# Patient Record
Sex: FEMALE
[De-identification: names, ages, dates, MRNs, and addresses within clinical notes are randomized per-mention and may not be internally consistent; named-entity substitution may affect disease eponyms.]

---

## 2017-03-22 ENCOUNTER — RX ONLY (OUTPATIENT)
Age: 77
Setting detail: RX ONLY
End: 2017-03-22

## 2018-06-14 ENCOUNTER — APPOINTMENT (RX ONLY)
Dept: URBAN - NONMETROPOLITAN AREA CLINIC 13 | Facility: CLINIC | Age: 78
Setting detail: DERMATOLOGY
End: 2018-06-14

## 2018-06-14 DIAGNOSIS — L71.8 OTHER ROSACEA: ICD-10-CM

## 2018-06-14 PROBLEM — L85.3 XEROSIS CUTIS: Status: ACTIVE | Noted: 2018-06-14

## 2018-06-14 PROCEDURE — ? COUNSELING

## 2018-06-14 PROCEDURE — ? PRODUCT LINE (OFFICE PRODUCTS)

## 2018-06-14 PROCEDURE — 99202 OFFICE O/P NEW SF 15 MIN: CPT

## 2018-06-14 ASSESSMENT — LOCATION SIMPLE DESCRIPTION DERM
LOCATION SIMPLE: NOSE
LOCATION SIMPLE: GLABELLA

## 2018-06-14 ASSESSMENT — LOCATION DETAILED DESCRIPTION DERM
LOCATION DETAILED: GLABELLA
LOCATION DETAILED: NASAL SUPRATIP

## 2018-06-14 ASSESSMENT — LOCATION ZONE DERM
LOCATION ZONE: FACE
LOCATION ZONE: NOSE

## 2018-06-14 NOTE — PROCEDURE: PRODUCT LINE (OFFICE PRODUCTS)
Product 17 Units: 0
Product 17 Price (In Dollars - Numeric Only, No Special Characters Or $): 0.00
Name Of Product 1: Acne Triple Gel Combination
Product 3 Application Directions: Apply to face every night. Leave on all night. Wash off in the am.
Product 1 Price (In Dollars - Numeric Only, No Special Characters Or $): 45
Name Of Product 4: Rosacea Triple Gel Combination
Name Of Product 5: Tretinoin 0.025% Hydrating Cream
Name Of Product 6: Wart Cream
Product 5 Application Directions: Wash face every night with a mild soap. No scrubbing. Allow face to dry. Apply Monday, Wednesday and Friday for 3 weeks. Then increase to every other night for 3 weeks. Then every night as tolerated.
Product 6 Application Directions: Soak warts in lukewarm water am and pm with tape on. Remove tape. Scrape off dead skin. Apply medication. Allow to dry. Re-tape.
Product 4 Application Directions: Wash face every night with lukewarm water. No scrubbing. Allow face to dry. Apply medication. Leave on all night and wash off in the am. [id number]
Name Of Product 2: Anti-Fungal Nail Solution
Send Charges To Patient Encounter: No
Product 6 Price (In Dollars - Numeric Only, No Special Characters Or $): 40
Product 3 Price (In Dollars - Numeric Only, No Special Characters Or $): 50
Render Product Pricing In Note: Yes
Detail Level: Zone
Product 2 Application Directions: Apply 1 drop to affected nails every night for 1 year.
Product 1 Application Directions: Wash face at bedtime with a mild soap. Allow face to dry for 15 minutes. Apply medication. Leave on all night. Wash off in the am.
Product 4 Units: 1
Name Of Product 3: Dapsone Gel

## 2019-05-03 ENCOUNTER — APPOINTMENT (RX ONLY)
Dept: URBAN - NONMETROPOLITAN AREA CLINIC 13 | Facility: CLINIC | Age: 79
Setting detail: DERMATOLOGY
End: 2019-05-03

## 2019-05-03 DIAGNOSIS — L57.8 OTHER SKIN CHANGES DUE TO CHRONIC EXPOSURE TO NONIONIZING RADIATION: ICD-10-CM

## 2019-05-03 DIAGNOSIS — D69.2 OTHER NONTHROMBOCYTOPENIC PURPURA: ICD-10-CM

## 2019-05-03 DIAGNOSIS — D22 MELANOCYTIC NEVI: ICD-10-CM

## 2019-05-03 DIAGNOSIS — L82.1 OTHER SEBORRHEIC KERATOSIS: ICD-10-CM

## 2019-05-03 DIAGNOSIS — D18.0 HEMANGIOMA: ICD-10-CM

## 2019-05-03 PROBLEM — D18.01 HEMANGIOMA OF SKIN AND SUBCUTANEOUS TISSUE: Status: ACTIVE | Noted: 2019-05-03

## 2019-05-03 PROBLEM — D22.5 MELANOCYTIC NEVI OF TRUNK: Status: ACTIVE | Noted: 2019-05-03

## 2019-05-03 PROCEDURE — 99214 OFFICE O/P EST MOD 30 MIN: CPT

## 2019-05-03 PROCEDURE — ? SUNSCREEN RECOMMENDATIONS

## 2019-05-03 PROCEDURE — ? COUNSELING

## 2019-05-03 PROCEDURE — ? ADDITIONAL NOTES

## 2019-05-03 ASSESSMENT — LOCATION ZONE DERM
LOCATION ZONE: TRUNK
LOCATION ZONE: ARM

## 2019-05-03 ASSESSMENT — LOCATION SIMPLE DESCRIPTION DERM
LOCATION SIMPLE: LEFT UPPER BACK
LOCATION SIMPLE: LEFT UPPER ARM
LOCATION SIMPLE: LEFT LOWER BACK
LOCATION SIMPLE: UPPER BACK

## 2019-05-03 ASSESSMENT — LOCATION DETAILED DESCRIPTION DERM
LOCATION DETAILED: LEFT SUPERIOR MEDIAL MIDBACK
LOCATION DETAILED: SUPERIOR THORACIC SPINE
LOCATION DETAILED: LEFT ANTERIOR PROXIMAL UPPER ARM
LOCATION DETAILED: LEFT MEDIAL UPPER BACK